# Patient Record
Sex: MALE | Race: OTHER | HISPANIC OR LATINO
[De-identification: names, ages, dates, MRNs, and addresses within clinical notes are randomized per-mention and may not be internally consistent; named-entity substitution may affect disease eponyms.]

---

## 2018-06-27 ENCOUNTER — APPOINTMENT (OUTPATIENT)
Dept: PULMONOLOGY | Facility: CLINIC | Age: 73
End: 2018-06-27
Payer: MEDICARE

## 2018-06-27 VITALS
OXYGEN SATURATION: 97 % | DIASTOLIC BLOOD PRESSURE: 80 MMHG | BODY MASS INDEX: 29.86 KG/M2 | HEART RATE: 86 BPM | SYSTOLIC BLOOD PRESSURE: 128 MMHG | HEIGHT: 68 IN | WEIGHT: 197 LBS

## 2018-06-27 PROCEDURE — 94664 DEMO&/EVAL PT USE INHALER: CPT | Mod: 59

## 2018-06-27 PROCEDURE — 94060 EVALUATION OF WHEEZING: CPT

## 2018-06-27 PROCEDURE — 99205 OFFICE O/P NEW HI 60 MIN: CPT | Mod: 25

## 2018-06-27 PROCEDURE — 94729 DIFFUSING CAPACITY: CPT

## 2018-06-27 PROCEDURE — 94727 GAS DIL/WSHOT DETER LNG VOL: CPT

## 2018-06-27 PROCEDURE — 85018 HEMOGLOBIN: CPT | Mod: QW

## 2018-12-12 ENCOUNTER — APPOINTMENT (OUTPATIENT)
Dept: PULMONOLOGY | Facility: CLINIC | Age: 73
End: 2018-12-12

## 2018-12-21 ENCOUNTER — APPOINTMENT (OUTPATIENT)
Dept: ORTHOPEDIC SURGERY | Facility: CLINIC | Age: 73
End: 2018-12-21
Payer: MEDICARE

## 2018-12-21 VITALS
HEIGHT: 68 IN | HEART RATE: 89 BPM | BODY MASS INDEX: 29.86 KG/M2 | WEIGHT: 197 LBS | DIASTOLIC BLOOD PRESSURE: 78 MMHG | SYSTOLIC BLOOD PRESSURE: 158 MMHG

## 2018-12-21 DIAGNOSIS — M16.11 UNILATERAL PRIMARY OSTEOARTHRITIS, RIGHT HIP: ICD-10-CM

## 2018-12-21 DIAGNOSIS — M17.11 UNILATERAL PRIMARY OSTEOARTHRITIS, RIGHT KNEE: ICD-10-CM

## 2018-12-21 DIAGNOSIS — Z78.9 OTHER SPECIFIED HEALTH STATUS: ICD-10-CM

## 2018-12-21 DIAGNOSIS — Z80.9 FAMILY HISTORY OF MALIGNANT NEOPLASM, UNSPECIFIED: ICD-10-CM

## 2018-12-21 PROCEDURE — 99203 OFFICE O/P NEW LOW 30 MIN: CPT

## 2018-12-21 PROCEDURE — 73502 X-RAY EXAM HIP UNI 2-3 VIEWS: CPT | Mod: RT

## 2018-12-21 PROCEDURE — 73562 X-RAY EXAM OF KNEE 3: CPT | Mod: RT

## 2019-08-19 ENCOUNTER — APPOINTMENT (OUTPATIENT)
Dept: PULMONOLOGY | Facility: CLINIC | Age: 74
End: 2019-08-19
Payer: MEDICARE

## 2019-08-19 VITALS
WEIGHT: 194 LBS | DIASTOLIC BLOOD PRESSURE: 78 MMHG | BODY MASS INDEX: 29.5 KG/M2 | HEART RATE: 78 BPM | SYSTOLIC BLOOD PRESSURE: 130 MMHG | OXYGEN SATURATION: 96 %

## 2019-08-19 DIAGNOSIS — Z12.2 ENCOUNTER FOR SCREENING FOR MALIGNANT NEOPLASM OF RESPIRATORY ORGANS: ICD-10-CM

## 2019-08-19 DIAGNOSIS — R05 COUGH: ICD-10-CM

## 2019-08-19 DIAGNOSIS — F17.200 NICOTINE DEPENDENCE, UNSPECIFIED, UNCOMPLICATED: ICD-10-CM

## 2019-08-19 PROCEDURE — 94010 BREATHING CAPACITY TEST: CPT

## 2019-08-19 PROCEDURE — 99214 OFFICE O/P EST MOD 30 MIN: CPT | Mod: 25

## 2019-08-19 RX ORDER — BUPROPION HYDROCHLORIDE 75 MG/1
75 TABLET, FILM COATED ORAL
Refills: 0 | Status: DISCONTINUED | COMMUNITY
End: 2019-08-19

## 2019-08-19 NOTE — PHYSICAL EXAM
[General Appearance - Well Developed] : well developed [General Appearance - Well Nourished] : well nourished [Normal Conjunctiva] : the conjunctiva exhibited no abnormalities [Normal Oropharynx] : normal oropharynx [Neck Appearance] : the appearance of the neck was normal [Jugular Venous Distention Increased] : there was no jugular-venous distention [Neck Cervical Mass (___cm)] : no neck mass was observed [Heart Rate And Rhythm] : heart rate and rhythm were normal [Thyroid Diffuse Enlargement] : the thyroid was not enlarged [Murmurs] : no murmurs present [Heart Sounds] : normal S1 and S2 [Edema] : no peripheral edema present [Arterial Pulses Normal] : the arterial pulses were normal [Respiration, Rhythm And Depth] : normal respiratory rhythm and effort [] : no respiratory distress [Exaggerated Use Of Accessory Muscles For Inspiration] : no accessory muscle use [Auscultation Breath Sounds / Voice Sounds] : lungs were clear to auscultation bilaterally [Abdomen Soft] : soft [Abdomen Tenderness] : non-tender [Abnormal Walk] : normal gait [Nail Clubbing] : no clubbing of the fingernails [Cyanosis, Localized] : no localized cyanosis [Oriented To Time, Place, And Person] : oriented to person, place, and time [No Focal Deficits] : no focal deficits [Skin Turgor] : normal skin turgor

## 2019-08-22 NOTE — REASON FOR VISIT
[Follow-Up] : a follow-up visit [Cough] : cough [FreeTextEntry2] : encounter for lung cancer screening

## 2019-10-22 ENCOUNTER — APPOINTMENT (OUTPATIENT)
Dept: PULMONOLOGY | Facility: CLINIC | Age: 74
End: 2019-10-22
Payer: MEDICARE

## 2019-10-22 VITALS
WEIGHT: 192 LBS | OXYGEN SATURATION: 98 % | HEART RATE: 80 BPM | DIASTOLIC BLOOD PRESSURE: 70 MMHG | HEIGHT: 68.5 IN | SYSTOLIC BLOOD PRESSURE: 140 MMHG | BODY MASS INDEX: 28.77 KG/M2

## 2019-10-22 VITALS — RESPIRATION RATE: 16 BRPM

## 2019-10-22 PROCEDURE — 99214 OFFICE O/P EST MOD 30 MIN: CPT

## 2019-10-22 RX ORDER — GUAIFENESIN/DEXTROMETHORPHAN 1200-60MG
1200-60 TABLET, EXTENDED RELEASE 12 HR ORAL
Refills: 0 | Status: DISCONTINUED | COMMUNITY
End: 2019-10-22

## 2019-10-22 NOTE — ASSESSMENT
[FreeTextEntry1] : I suspect the patient has obstructive sleep apnea owing to his oral anatomy and is unrefreshing sleep. A home sleep study has been ordered and if positive will be treated with auto Pap. If negative, he will need a formal in-lab sleep study.
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
## 2019-10-22 NOTE — PHYSICAL EXAM
[General Appearance - Well Nourished] : well nourished [General Appearance - Well Developed] : well developed [Normal Conjunctiva] : the conjunctiva exhibited no abnormalities [Normal Oropharynx] : abnormal oropharynx [Enlarged Base of the Tongue] : enlargement of the base of the tongue [Low Lying Soft Palate] : low lying soft palate [III] : III [FreeTextEntry1] : Lateral ridging to tongue [Neck Appearance] : the appearance of the neck was normal [Neck Cervical Mass (___cm)] : no neck mass was observed [Thyroid Diffuse Enlargement] : the thyroid was not enlarged [Jugular Venous Distention Increased] : there was no jugular-venous distention [Neck Circumference: ___] : neck circumference is [unfilled] [Heart Rate And Rhythm] : heart rate and rhythm were normal [Murmurs] : no murmurs present [Heart Sounds] : normal S1 and S2 [Arterial Pulses Normal] : the arterial pulses were normal [] : no respiratory distress [Edema] : no peripheral edema present [Respiration, Rhythm And Depth] : normal respiratory rhythm and effort [Exaggerated Use Of Accessory Muscles For Inspiration] : no accessory muscle use [Auscultation Breath Sounds / Voice Sounds] : lungs were clear to auscultation bilaterally [Abdomen Soft] : soft [Abdomen Tenderness] : non-tender [Abnormal Walk] : normal gait [Nail Clubbing] : no clubbing of the fingernails [Cyanosis, Localized] : no localized cyanosis [No Focal Deficits] : no focal deficits [Oriented To Time, Place, And Person] : oriented to person, place, and time [Skin Turgor] : normal skin turgor

## 2019-10-23 ENCOUNTER — APPOINTMENT (OUTPATIENT)
Dept: PULMONOLOGY | Facility: CLINIC | Age: 74
End: 2019-10-23

## 2019-11-21 ENCOUNTER — OUTPATIENT (OUTPATIENT)
Dept: OUTPATIENT SERVICES | Facility: HOSPITAL | Age: 74
LOS: 1 days | End: 2019-11-21
Payer: MEDICARE

## 2019-11-21 DIAGNOSIS — G47.33 OBSTRUCTIVE SLEEP APNEA (ADULT) (PEDIATRIC): ICD-10-CM

## 2019-11-21 PROCEDURE — G0399: CPT

## 2019-11-21 PROCEDURE — 95806 SLEEP STUDY UNATT&RESP EFFT: CPT | Mod: 26

## 2019-11-21 PROCEDURE — 95806 SLEEP STUDY UNATT&RESP EFFT: CPT

## 2020-01-17 ENCOUNTER — APPOINTMENT (OUTPATIENT)
Dept: PULMONOLOGY | Facility: CLINIC | Age: 75
End: 2020-01-17

## 2021-03-31 ENCOUNTER — APPOINTMENT (OUTPATIENT)
Dept: NEUROLOGY | Facility: CLINIC | Age: 76
End: 2021-03-31
Payer: MEDICARE

## 2021-03-31 VITALS — DIASTOLIC BLOOD PRESSURE: 80 MMHG | TEMPERATURE: 98.2 F | HEIGHT: 68.5 IN | SYSTOLIC BLOOD PRESSURE: 160 MMHG

## 2021-03-31 PROCEDURE — 99072 ADDL SUPL MATRL&STAF TM PHE: CPT

## 2021-03-31 PROCEDURE — 99204 OFFICE O/P NEW MOD 45 MIN: CPT

## 2022-01-31 ENCOUNTER — APPOINTMENT (OUTPATIENT)
Dept: NEUROLOGY | Facility: CLINIC | Age: 77
End: 2022-01-31
Payer: MEDICARE

## 2022-01-31 VITALS
DIASTOLIC BLOOD PRESSURE: 90 MMHG | BODY MASS INDEX: 30.31 KG/M2 | HEIGHT: 68 IN | WEIGHT: 200 LBS | SYSTOLIC BLOOD PRESSURE: 130 MMHG

## 2022-01-31 DIAGNOSIS — G50.1 ATYPICAL FACIAL PAIN: ICD-10-CM

## 2022-01-31 PROCEDURE — 99214 OFFICE O/P EST MOD 30 MIN: CPT

## 2022-05-02 ENCOUNTER — APPOINTMENT (OUTPATIENT)
Dept: NEUROLOGY | Facility: CLINIC | Age: 77
End: 2022-05-02
Payer: MEDICARE

## 2022-05-02 VITALS — HEIGHT: 68 IN | BODY MASS INDEX: 28.79 KG/M2 | WEIGHT: 190 LBS

## 2022-05-02 DIAGNOSIS — G50.0 TRIGEMINAL NEURALGIA: ICD-10-CM

## 2022-05-02 PROCEDURE — 99214 OFFICE O/P EST MOD 30 MIN: CPT

## 2022-05-02 NOTE — DATA REVIEWED
[de-identified] : Brain and Orbit MRI dated 8/6/18 reports  minimal small vessel ischemic changes.\par \par Brain MRA  dated 7/11/18 reported negative. [de-identified] : Other records available to review includes:\par Pulmonary dr. Connell\par Orthopedic note Dr Vaughan

## 2022-05-02 NOTE — HISTORY OF PRESENT ILLNESS
[FreeTextEntry1] : I saw him initially 3/31/21 with the following history. He is here with his wife who serves as independent historian. He underwent dental implants left maxillary region in January of 2018. Following this he developed left-sided facial pain which has persisted. Will wax and wane, occasionally severe. He gets stabbing pain emanating from the jaw radiating up the side of the face. There is also pins and needles sensation. Touching the area can bring it out\par \par He has had imaging studies as discussed below. He has seen neurologists in the past. He has tried Lyrica and carbamazepine which did not help. He is thinking of trying medical marijuana.\par \par I prescribed gabapentin 300 mg twice a day. This helped initially and pain resolved and he stopped the gabapentin. Pain has come back and the gabapentin at 300 mg twice a day was not helping.  We built the gabapentin up to 600 mg 3 times a day.  He has been doing well on that and is pain-free.

## 2022-05-02 NOTE — ASSESSMENT
[FreeTextEntry1] : Atypical facial pain. Possible trigeminal meralgia.\par \par He will try and slowly reduce the gabapentin as tolerated.\par \par Routine followup scheduled for 3 months

## 2022-05-02 NOTE — CONSULT LETTER
[Dear  ___] : Dear  [unfilled], [Consult Letter:] : I had the pleasure of evaluating your patient, [unfilled]. [Please see my note below.] : Please see my note below. [Consult Closing:] : Thank you very much for allowing me to participate in the care of this patient.  If you have any questions, please do not hesitate to contact me. [Sincerely,] : Sincerely, [FreeTextEntry3] : Jim Banks MD, PhD, DPN-N\par [facility]\par Neurology at [place]\par Chief, Division of Neurology\par [facility]\par

## 2022-05-02 NOTE — PHYSICAL EXAM
[General Appearance - Alert] : alert [General Appearance - In No Acute Distress] : in no acute distress [General Appearance - Well-Appearing] : healthy appearing [Oriented To Time, Place, And Person] : oriented to person, place, and time [Impaired Insight] : insight and judgment were intact [Affect] : the affect was normal [Memory Recent] : recent memory was not impaired [Person] : oriented to person [Place] : oriented to place [Time] : oriented to time [Concentration Intact] : normal concentrating ability [Fluency] : fluency intact [Comprehension] : comprehension intact [Cranial Nerves Optic (II)] : visual acuity intact bilaterally,  visual fields full to confrontation, pupils equal round and reactive to light [Cranial Nerves Oculomotor (III)] : extraocular motion intact [Cranial Nerves Trigeminal (V)] : facial sensation intact symmetrically [Cranial Nerves Facial (VII)] : face symmetrical [Cranial Nerves Vestibulocochlear (VIII)] : hearing was intact bilaterally [Cranial Nerves Glossopharyngeal (IX)] : tongue and palate midline [Cranial Nerves Accessory (XI - Cranial And Spinal)] : head turning and shoulder shrug symmetric [Cranial Nerves Hypoglossal (XII)] : there was no tongue deviation with protrusion [Motor Tone] : muscle tone was normal in all four extremities [Motor Strength] : muscle strength was normal in all four extremities [No Muscle Atrophy] : normal bulk in all four extremities [Paresis Pronator Drift Right-Sided] : no pronator drift on the right [Paresis Pronator Drift Left-Sided] : no pronator drift on the left [Sensation Tactile Decrease] : light touch was intact [Sensation Pain / Temperature Decrease] : pain and temperature was intact [Romberg's Sign] : Romberg's sign was negtive [Abnormal Walk] : normal gait [Balance] : balance was intact [Past-pointing] : there was no past-pointing [Tremor] : no tremor present [Coordination - Dysmetria Impaired Finger-to-Nose Bilateral] : not present [Coordination - Dysmetria Impaired Heel-to-Shin Bilateral] : not present [2+] : Ankle jerk left 2+ [Plantar Reflex Right Only] : normal on the right [Plantar Reflex Left Only] : normal on the left [PERRL With Normal Accommodation] : pupils were equal in size, round, reactive to light, with normal accommodation [Extraocular Movements] : extraocular movements were intact [Full Visual Field] : full visual field

## 2022-06-21 ENCOUNTER — APPOINTMENT (OUTPATIENT)
Dept: PULMONOLOGY | Facility: CLINIC | Age: 77
End: 2022-06-21
Payer: MEDICARE

## 2022-06-21 VITALS
RESPIRATION RATE: 16 BRPM | OXYGEN SATURATION: 98 % | HEART RATE: 78 BPM | WEIGHT: 189 LBS | SYSTOLIC BLOOD PRESSURE: 132 MMHG | HEIGHT: 68 IN | DIASTOLIC BLOOD PRESSURE: 74 MMHG | BODY MASS INDEX: 28.64 KG/M2

## 2022-06-21 PROCEDURE — 99214 OFFICE O/P EST MOD 30 MIN: CPT | Mod: 25

## 2022-06-21 PROCEDURE — 99406 BEHAV CHNG SMOKING 3-10 MIN: CPT

## 2022-06-21 RX ORDER — GABAPENTIN 600 MG/1
600 TABLET, COATED ORAL 3 TIMES DAILY
Qty: 90 | Refills: 3 | Status: DISCONTINUED | COMMUNITY
Start: 2022-01-31 | End: 2022-06-21

## 2022-06-21 NOTE — COUNSELING
[Cessation strategies including cessation program discussed] : Cessation strategies including cessation program discussed [Use of nicotine replacement therapies and other medications discussed] : Use of nicotine replacement therapies and other medications discussed [Encouraged to pick a quit date and identify support needed to quit] : Encouraged to pick a quit date and identify support needed to quit [Yes] : Willing to quit smoking [FreeTextEntry1] : 5

## 2022-06-21 NOTE — PHYSICAL EXAM
[No Acute Distress] : no acute distress [Normal Oropharynx] : normal oropharynx [Retrognathia] : retrognathia [Normal Appearance] : normal appearance [No Neck Mass] : no neck mass [Normal Rate/Rhythm] : normal rate/rhythm [Normal S1, S2] : normal s1, s2 [No Murmurs] : no murmurs [No Resp Distress] : no resp distress [Clear to Auscultation Bilaterally] : clear to auscultation bilaterally [No Abnormalities] : no abnormalities [Benign] : benign [Normal Gait] : normal gait [No Clubbing] : no clubbing [No Cyanosis] : no cyanosis [No Edema] : no edema [FROM] : FROM [Normal Color/ Pigmentation] : normal color/ pigmentation [No Focal Deficits] : no focal deficits [Oriented x3] : oriented x3 [Normal Affect] : normal affect

## 2022-06-21 NOTE — HISTORY OF PRESENT ILLNESS
[TextBox_4] : The patient has not been seen here in almost 3 years. He continues to smoke one half pack of cigarettes per day. Denies shortness of breath coughing or wheezing. He has not had a screening CT scan in 3 years. Recent chest x-rays have been unremarkable although one was read as showing COPD.

## 2022-06-21 NOTE — ASSESSMENT
[FreeTextEntry1] : Patient does not have significant COPD. Pulmonary function studies have been at the lower limits of normal in the past. He is asymptomatic. Low dose screening CT scan has been ordered. We'll follow that up with him by phone. If all is well we can screen him once a year.\par \par Smoking cessation advised.

## 2022-08-02 ENCOUNTER — APPOINTMENT (OUTPATIENT)
Dept: NEUROLOGY | Facility: CLINIC | Age: 77
End: 2022-08-02

## 2023-05-12 ENCOUNTER — NON-APPOINTMENT (OUTPATIENT)
Age: 78
End: 2023-05-12

## 2023-05-24 ENCOUNTER — APPOINTMENT (OUTPATIENT)
Dept: FAMILY MEDICINE | Facility: CLINIC | Age: 78
End: 2023-05-24
Payer: MEDICARE

## 2023-05-24 VITALS
WEIGHT: 197 LBS | TEMPERATURE: 98.8 F | BODY MASS INDEX: 29.86 KG/M2 | OXYGEN SATURATION: 98 % | HEART RATE: 84 BPM | DIASTOLIC BLOOD PRESSURE: 87 MMHG | HEIGHT: 68 IN | RESPIRATION RATE: 16 BRPM | SYSTOLIC BLOOD PRESSURE: 143 MMHG

## 2023-05-24 DIAGNOSIS — M53.3 SACROCOCCYGEAL DISORDERS, NOT ELSEWHERE CLASSIFIED: ICD-10-CM

## 2023-05-24 DIAGNOSIS — Z00.00 ENCOUNTER FOR GENERAL ADULT MEDICAL EXAMINATION W/OUT ABNORMAL FINDINGS: ICD-10-CM

## 2023-05-24 PROCEDURE — 99387 INIT PM E/M NEW PAT 65+ YRS: CPT | Mod: 25

## 2023-05-24 PROCEDURE — G0444 DEPRESSION SCREEN ANNUAL: CPT | Mod: 59

## 2023-05-24 PROCEDURE — 36415 COLL VENOUS BLD VENIPUNCTURE: CPT

## 2023-05-24 NOTE — REVIEW OF SYSTEMS
[Depression] : depression [Negative] : Gastrointestinal [Unsteady Walk] : no ataxia [Suicidal] : not suicidal [FreeTextEntry9] : Sacral pain

## 2023-05-24 NOTE — ASSESSMENT
[FreeTextEntry1] : CPE-routine labs drawn today, will follow-up results.  We will request records from previous PCP Dr. Escobar.\par \par Screenings: For repeat colonoscopy, referral back to Dr. PIERCE given today.\par Positive depression screen.  He currently contracts to safety. DASH/crisis resources given.  Will restart Wellbutrin.\par \par Sacral pain-we will further evaluate with an x-ray.  Follow-up to be determined based on results.\par \par Hypertension-we will restart amlodipine 5 mg daily. \par \par Current everyday smoker-counseled on smoking cessation.  He is willing to try Wellbutrin again, Rx prescribed.\par \par Atypical chest pain-currently not in pain, however given risk factors, will refer to cardiology for cardiac work-up including echocardiogram.  Also will refer back to GI determine if repeat endoscopy needed\par \par Depression-currently contracts to safety.  Will restart Wellbutrin and have him follow-up in 2 months for reevaluation.  Offered behavioral health counseling which he declines at this time.\par \par RTC in 2 months.

## 2023-05-24 NOTE — HISTORY OF PRESENT ILLNESS
[Spouse] : spouse [FreeTextEntry1] : pt. here for cJasonpmacie. [de-identified] : Brandon is a 76 yo M with pmhx of pretension, facial pain, nicotine dependence, occasional reflux, mild cognitive deficiency, who presents for new patient CPE.  Previous PCP Dr. Lincoln Escobar no longer accepted by his insurance. \par \par For his hypertension, he was previously on amlodipine 5 mg daily but ran out of medications so has been without it.\par \par He takes gabapentin occasionally for the facial pain.  Follows with neurology Dr. Banks.  He states that he was also evaluated by another neurologist for memory changes and diagnosed with the mild cognitive deficiency.\par \par He is a current smoker, has smoked since the age of 19, smokes 10 to 15 cigarettes/day.  He has been able to quit temporarily at times with the nicotine patch and with Wellbutrin.  He is requesting to restart Wellbutrin for the smoking as well as to help with depression.  He reports occasional SI but states that he would never follow through with it, siting that he needs to be around for his wife and to take care of logistical matters. \par \par He reports sacral pain that is increased in the last year.  Denies any recent trauma, states that he fell and landed on his sacrum when he was about 8 years old.  Denies any bowel or bladder incontinence.  He states that when he gets up from a sitting position he has to do so very slowly due to severe pain.  He states he has been told he has cervical disc herniations at C6-C7.\par \par He saw pulmonology Dr. Connell for COPD.  PFTs in the past have been at the lower limits of normal.  He is otherwise asymptomatic.  He did have a low-dose screening CT 7/1/2023 which was negative.\par \par Last colonoscopy in 2016 and was told to repeat in 5 years but has not done so.  He has also had endoscopies in the past with .\par \par He reports occasional epigastric pain.  This is usually relieved with drinking cold water.  He would like to see a cardiologist.

## 2023-05-25 ENCOUNTER — NON-APPOINTMENT (OUTPATIENT)
Age: 78
End: 2023-05-25

## 2023-05-25 LAB
25(OH)D3 SERPL-MCNC: 28.9 NG/ML
ALBUMIN SERPL ELPH-MCNC: 4.2 G/DL
ALP BLD-CCNC: 81 U/L
ALT SERPL-CCNC: 21 U/L
ANION GAP SERPL CALC-SCNC: 14 MMOL/L
AST SERPL-CCNC: 15 U/L
BILIRUB SERPL-MCNC: 0.3 MG/DL
BUN SERPL-MCNC: 17 MG/DL
CALCIUM SERPL-MCNC: 9.2 MG/DL
CHLORIDE SERPL-SCNC: 109 MMOL/L
CHOLEST SERPL-MCNC: 181 MG/DL
CO2 SERPL-SCNC: 22 MMOL/L
CREAT SERPL-MCNC: 1.11 MG/DL
EGFR: 69 ML/MIN/1.73M2
ESTIMATED AVERAGE GLUCOSE: 126 MG/DL
GLUCOSE SERPL-MCNC: 140 MG/DL
HBA1C MFR BLD HPLC: 6 %
HCV AB SER QL: NONREACTIVE
HCV S/CO RATIO: 0.69 S/CO
HDLC SERPL-MCNC: 50 MG/DL
HIV1+2 AB SPEC QL IA.RAPID: NONREACTIVE
LDLC SERPL CALC-MCNC: 94 MG/DL
NONHDLC SERPL-MCNC: 131 MG/DL
POTASSIUM SERPL-SCNC: 4.2 MMOL/L
PROT SERPL-MCNC: 6.9 G/DL
SODIUM SERPL-SCNC: 145 MMOL/L
TRIGL SERPL-MCNC: 182 MG/DL
TSH SERPL-ACNC: 1.88 UIU/ML

## 2023-05-26 ENCOUNTER — NON-APPOINTMENT (OUTPATIENT)
Age: 78
End: 2023-05-26

## 2023-06-07 ENCOUNTER — NON-APPOINTMENT (OUTPATIENT)
Age: 78
End: 2023-06-07

## 2023-06-07 ENCOUNTER — TRANSCRIPTION ENCOUNTER (OUTPATIENT)
Age: 78
End: 2023-06-07

## 2023-07-24 ENCOUNTER — APPOINTMENT (OUTPATIENT)
Dept: FAMILY MEDICINE | Facility: CLINIC | Age: 78
End: 2023-07-24
Payer: MEDICARE

## 2023-07-24 VITALS
SYSTOLIC BLOOD PRESSURE: 125 MMHG | RESPIRATION RATE: 16 BRPM | HEART RATE: 80 BPM | DIASTOLIC BLOOD PRESSURE: 78 MMHG | TEMPERATURE: 98.1 F

## 2023-07-24 DIAGNOSIS — R45.89 OTHER SYMPTOMS AND SIGNS INVOLVING EMOTIONAL STATE: ICD-10-CM

## 2023-07-24 DIAGNOSIS — H61.21 IMPACTED CERUMEN, RIGHT EAR: ICD-10-CM

## 2023-07-24 PROCEDURE — 99214 OFFICE O/P EST MOD 30 MIN: CPT

## 2023-07-24 NOTE — ASSESSMENT
[FreeTextEntry1] : HTN-blood pressure improved and expectable since restarting amlodipine 5 mg daily, refill sent\par \par HLD-reviewed his lab work in detail with him and his wife.  Discussed that even though his lipid panel numbers look good, he has an elevated 10-year ASCVD risk score of 28%.  For this reason, discussed risk factors including hypertension and smoking and will start atorvastatin 10 mg daily.\par \par Current everyday smoker-counseled on smoking cessation.  Has been able to decrease number of cigarettes smoked since starting Wellbutrin.  Since he is feeling well on current dose, will continue at 150 mg daily and reevaluate at next visit if dosage needs to be adjusted\par \par Depressed mood-improved since starting Wellbutrin, continue same dose for now and will reevaluate at next visit\par \par Impacted cerumen of right ear-counseled on using Debrox drops and flushing with warm water while sitting\par \par RTC in 6 months

## 2023-07-24 NOTE — HISTORY OF PRESENT ILLNESS
[Spouse] : spouse [FreeTextEntry1] : 2 month follow up [de-identified] : Brandon is a 77-year-old male with past medical history of hypertension, facial pain, nicotine dependence, occasional reflux, mild cognitive deficiency who presents today for follow-up.\par \par For his hypertension, he is taking amlodipine 5 mg daily and has been compliant with it.\par \par For his smoking and depression, at the time of our last visit he was prescribed Wellbutrin to try again.  He has been tolerating the Wellbutrin 150 mg daily.  He states that it has been helping with his depression.  He has been able to cut down on cigarette smoking but not completely discontinue.  He notes recent stressors including trying to buy a new car for his wife.  For this reason, he has not yet scheduled an appointment with cardiology or gastroenterology but is planning on doing so\par \par He notes decreased hearing in his right ear\par

## 2023-08-15 ENCOUNTER — NON-APPOINTMENT (OUTPATIENT)
Age: 78
End: 2023-08-15

## 2023-08-22 ENCOUNTER — NON-APPOINTMENT (OUTPATIENT)
Age: 78
End: 2023-08-22

## 2023-08-22 ENCOUNTER — APPOINTMENT (OUTPATIENT)
Dept: GASTROENTEROLOGY | Facility: CLINIC | Age: 78
End: 2023-08-22
Payer: MEDICARE

## 2023-08-22 VITALS
SYSTOLIC BLOOD PRESSURE: 152 MMHG | BODY MASS INDEX: 29.55 KG/M2 | HEIGHT: 68 IN | WEIGHT: 195 LBS | RESPIRATION RATE: 14 BRPM | HEART RATE: 86 BPM | TEMPERATURE: 98 F | DIASTOLIC BLOOD PRESSURE: 96 MMHG | OXYGEN SATURATION: 98 %

## 2023-08-22 DIAGNOSIS — R10.13 EPIGASTRIC PAIN: ICD-10-CM

## 2023-08-22 PROCEDURE — 99204 OFFICE O/P NEW MOD 45 MIN: CPT

## 2023-08-22 RX ORDER — GABAPENTIN 300 MG/1
300 CAPSULE ORAL TWICE DAILY
Qty: 60 | Refills: 2 | Status: DISCONTINUED | COMMUNITY
Start: 2021-03-31 | End: 2023-08-22

## 2023-08-22 NOTE — PHYSICAL EXAM

## 2023-08-23 NOTE — REASON FOR VISIT
[Initial Evaluation] : an initial evaluation [FreeTextEntry1] : colon cancer screening, history of polyps, epigastric pain, heartburn

## 2023-08-23 NOTE — ADDENDUM
[FreeTextEntry1] : I, Kenia Amin NP, acted as scribe for Dr. José Luis Parsons for this patient encounter

## 2023-08-23 NOTE — ASSESSMENT
[FreeTextEntry1] : 77 year old male with personal history of colon polyps and upper GI complaints presenting for evaluation. Epigastric pain, heartburn, dysphagia, likely secondary to chronic GERD. Will schedule both EGD and colonoscopy for further evaluation. I have discussed the indications, benefits, risks  (including but not limited to reaction to the anesthesia, infection, bleeding, missed lesions, and perforation),  and alternatives to an EGD and colonoscopy with the patient. He  understands all options and has agreed to have both procedures. He is medically optimized.   [facility] GERD diet  GI attending.  History, physical, assessment, plan as outlined above represents my understanding of this case.  ROBERT NP, served as my scribe.   77-year-old male with hypertension hyperlipidemia and personal history of colon polyps and positive family history for multiple members with colon cancer in first and second-degree relatives presents for evaluation of a polyp surveillance colonoscopy.  He also has a history of chronic heartburn regurgitation and dyspepsia.  No upper GI alarm symptoms.  Physical exam unrevealing.  Quite robust in appearance.  Appropriate candidate for polyp surveillance colonoscopy.  Appropriate candidate for EGD.  ASA #2.  Arrangements made.

## 2023-09-26 ENCOUNTER — TRANSCRIPTION ENCOUNTER (OUTPATIENT)
Age: 78
End: 2023-09-26

## 2023-09-27 ENCOUNTER — OUTPATIENT (OUTPATIENT)
Dept: OUTPATIENT SERVICES | Facility: HOSPITAL | Age: 78
LOS: 1 days | End: 2023-09-27
Payer: MEDICARE

## 2023-09-27 ENCOUNTER — RESULT REVIEW (OUTPATIENT)
Age: 78
End: 2023-09-27

## 2023-09-27 ENCOUNTER — APPOINTMENT (OUTPATIENT)
Dept: GASTROENTEROLOGY | Facility: GI CENTER | Age: 78
End: 2023-09-27
Payer: MEDICARE

## 2023-09-27 DIAGNOSIS — R13.19 OTHER DYSPHAGIA: ICD-10-CM

## 2023-09-27 DIAGNOSIS — Z12.11 ENCOUNTER FOR SCREENING FOR MALIGNANT NEOPLASM OF COLON: ICD-10-CM

## 2023-09-27 DIAGNOSIS — Z80.0 FAMILY HISTORY OF MALIGNANT NEOPLASM OF DIGESTIVE ORGANS: ICD-10-CM

## 2023-09-27 DIAGNOSIS — Z86.010 PERSONAL HISTORY OF COLONIC POLYPS: ICD-10-CM

## 2023-09-27 DIAGNOSIS — R10.13 EPIGASTRIC PAIN: ICD-10-CM

## 2023-09-27 PROCEDURE — 88342 IMHCHEM/IMCYTCHM 1ST ANTB: CPT

## 2023-09-27 PROCEDURE — 88305 TISSUE EXAM BY PATHOLOGIST: CPT

## 2023-09-27 PROCEDURE — 88305 TISSUE EXAM BY PATHOLOGIST: CPT | Mod: 26

## 2023-09-27 PROCEDURE — 43239 EGD BIOPSY SINGLE/MULTIPLE: CPT

## 2023-09-27 PROCEDURE — 88342 IMHCHEM/IMCYTCHM 1ST ANTB: CPT | Mod: 26

## 2023-09-27 PROCEDURE — 43239 EGD BIOPSY SINGLE/MULTIPLE: CPT | Mod: 59

## 2023-09-27 PROCEDURE — G0121: CPT

## 2023-09-27 PROCEDURE — 45378 DIAGNOSTIC COLONOSCOPY: CPT | Mod: PT

## 2023-09-29 ENCOUNTER — APPOINTMENT (OUTPATIENT)
Dept: NEUROLOGY | Facility: CLINIC | Age: 78
End: 2023-09-29

## 2023-10-12 LAB — SURGICAL PATHOLOGY STUDY: SIGNIFICANT CHANGE UP

## 2024-01-03 ENCOUNTER — RX RENEWAL (OUTPATIENT)
Age: 79
End: 2024-01-03

## 2024-01-23 ENCOUNTER — APPOINTMENT (OUTPATIENT)
Dept: FAMILY MEDICINE | Facility: CLINIC | Age: 79
End: 2024-01-23
Payer: MEDICARE

## 2024-01-23 ENCOUNTER — NON-APPOINTMENT (OUTPATIENT)
Age: 79
End: 2024-01-23

## 2024-01-23 VITALS
DIASTOLIC BLOOD PRESSURE: 84 MMHG | WEIGHT: 175 LBS | SYSTOLIC BLOOD PRESSURE: 130 MMHG | HEIGHT: 68 IN | BODY MASS INDEX: 26.52 KG/M2 | OXYGEN SATURATION: 98 % | HEART RATE: 86 BPM

## 2024-01-23 PROCEDURE — 36415 COLL VENOUS BLD VENIPUNCTURE: CPT

## 2024-01-23 PROCEDURE — 99406 BEHAV CHNG SMOKING 3-10 MIN: CPT

## 2024-01-23 PROCEDURE — 99214 OFFICE O/P EST MOD 30 MIN: CPT

## 2024-01-23 RX ORDER — BUPROPION HYDROCHLORIDE 150 MG/1
150 TABLET, EXTENDED RELEASE ORAL DAILY
Qty: 90 | Refills: 1 | Status: DISCONTINUED | COMMUNITY
Start: 2023-05-24 | End: 2024-01-23

## 2024-01-23 NOTE — HISTORY OF PRESENT ILLNESS
[FreeTextEntry1] : 79 yo patient presents to office for a follow up. Patient states he needs a referral CT of the lung as pulmonologist is on leave.  [de-identified] : Brandon is a 78-year-old male with past medical history of hypertension, nicotine dependence, mild cognitive deficiency, occasional reflux who presents today for follow-up.  For hypertension, he takes amlodipine 5 mg daily.  At the time of his last visit, he was started on atorvastatin 10 mg daily due to elevated 10-year ASCVD risk score.  He is tolerating this with no side effects.  He he stopped taking Wellbutrin because it was not helping him to decrease smoking.  He is now smoking a little less about 5 to 7 cigarettes/day but states that when he is stressed he smokes.  He has not seen a cardiologist but is interested in having cardiac workup.  He recalls receiving his pneumonia vaccine at his pharmacy.

## 2024-01-23 NOTE — ASSESSMENT
[FreeTextEntry1] : Hypertension-well-controlled on amlodipine, continue same  Hyperlipidemia-recheck lipid panel today, continue statin and counseled on healthy lifestyle modifications  Nicotine dependence/COPD-due for repeat annual low-dose CT, order given. counseled on benefits of smoking cessation and encouraged to continue with efforts.  RTC in 6 months for CPE, or sooner as needed

## 2024-01-26 ENCOUNTER — NON-APPOINTMENT (OUTPATIENT)
Age: 79
End: 2024-01-26

## 2024-01-29 LAB
ANION GAP SERPL CALC-SCNC: 14 MMOL/L
BUN SERPL-MCNC: 14 MG/DL
CALCIUM SERPL-MCNC: 9.5 MG/DL
CHLORIDE SERPL-SCNC: 104 MMOL/L
CHOLEST SERPL-MCNC: 154 MG/DL
CO2 SERPL-SCNC: 25 MMOL/L
CREAT SERPL-MCNC: 1.5 MG/DL
EGFR: 48 ML/MIN/1.73M2
ESTIMATED AVERAGE GLUCOSE: 128 MG/DL
GLUCOSE SERPL-MCNC: 104 MG/DL
HBA1C MFR BLD HPLC: 6.1 %
HDLC SERPL-MCNC: 52 MG/DL
LDLC SERPL CALC-MCNC: 79 MG/DL
NONHDLC SERPL-MCNC: 102 MG/DL
POTASSIUM SERPL-SCNC: 4.3 MMOL/L
SODIUM SERPL-SCNC: 143 MMOL/L
TRIGL SERPL-MCNC: 137 MG/DL

## 2024-01-30 ENCOUNTER — TRANSCRIPTION ENCOUNTER (OUTPATIENT)
Age: 79
End: 2024-01-30

## 2024-01-31 ENCOUNTER — TRANSCRIPTION ENCOUNTER (OUTPATIENT)
Age: 79
End: 2024-01-31

## 2024-01-31 ENCOUNTER — RX RENEWAL (OUTPATIENT)
Age: 79
End: 2024-01-31

## 2024-02-17 LAB
ANION GAP SERPL CALC-SCNC: 13 MMOL/L
BUN SERPL-MCNC: 17 MG/DL
CALCIUM SERPL-MCNC: 9.5 MG/DL
CHLORIDE SERPL-SCNC: 105 MMOL/L
CO2 SERPL-SCNC: 26 MMOL/L
CREAT SERPL-MCNC: 1.16 MG/DL
EGFR: 65 ML/MIN/1.73M2
GLUCOSE SERPL-MCNC: 93 MG/DL
POTASSIUM SERPL-SCNC: 4 MMOL/L
SODIUM SERPL-SCNC: 144 MMOL/L

## 2024-03-11 ENCOUNTER — APPOINTMENT (OUTPATIENT)
Dept: FAMILY MEDICINE | Facility: CLINIC | Age: 79
End: 2024-03-11
Payer: MEDICARE

## 2024-03-11 VITALS
WEIGHT: 199 LBS | SYSTOLIC BLOOD PRESSURE: 120 MMHG | BODY MASS INDEX: 30.16 KG/M2 | HEIGHT: 68 IN | DIASTOLIC BLOOD PRESSURE: 82 MMHG

## 2024-03-11 PROCEDURE — 36415 COLL VENOUS BLD VENIPUNCTURE: CPT

## 2024-03-11 PROCEDURE — 99406 BEHAV CHNG SMOKING 3-10 MIN: CPT

## 2024-03-11 PROCEDURE — G0444 DEPRESSION SCREEN ANNUAL: CPT | Mod: 59

## 2024-03-11 PROCEDURE — 99214 OFFICE O/P EST MOD 30 MIN: CPT

## 2024-03-11 RX ORDER — AMLODIPINE BESYLATE 5 MG/1
5 TABLET ORAL
Qty: 90 | Refills: 1 | Status: ACTIVE | COMMUNITY
Start: 2022-01-24 | End: 1900-01-01

## 2024-03-11 NOTE — ASSESSMENT
[FreeTextEntry1] : HTN- well controlled, continue amlodipine 5 mg daily  Hyperlipidemia-stable, continue atorvastatin 10 mg daily  Prediabetes-has been stable, will recheck A1c and counseled on healthy diet and lifestyle  Nicotine dependence-encouraged to continue with smoking cessation efforts.  Patient is in the precontemplative stage  I spent 5 minutes performing a depression screening on this patient. RTC in 3 months for CPE, or sooner as needed

## 2024-03-11 NOTE — HEALTH RISK ASSESSMENT
[No] : In the past 12 months have you used drugs other than those required for medical reasons? No [1] : 2) Feeling down, depressed, or hopeless for several days (1) [0] : 1) Little interest or pleasure doing things: Not at all (0) [PHQ-2 Negative - No further assessment needed] : PHQ-2 Negative - No further assessment needed [Current] : Current [15-19] : 15-19 [[id number]] : 1 [de-identified] : 1/2PPD

## 2024-03-11 NOTE — HISTORY OF PRESENT ILLNESS
[FreeTextEntry1] : follow up  [de-identified] : 78-year-old male with past medical history of hypertension, nicotine dependence, mild cognitive deficiency, occasional reflux who presents today for follow-up.  For hypertension, he takes amlodipine 5 mg daily. He is taking atorvastatin 10 mg daily. has list for cardiologists but has to make appt. He is still smoking 1/2 PPD, states this is less than before but does not want to cut down more. Wellbutrin was ineffective for him in the past.

## 2024-03-11 NOTE — HEALTH RISK ASSESSMENT
[No] : In the past 12 months have you used drugs other than those required for medical reasons? No [1] : 2) Feeling down, depressed, or hopeless for several days (1) [0] : 1) Little interest or pleasure doing things: Not at all (0) [PHQ-2 Negative - No further assessment needed] : PHQ-2 Negative - No further assessment needed [Current] : Current [15-19] : 15-19 [[id number]] : 1 [de-identified] : 1/2PPD

## 2024-03-11 NOTE — HISTORY OF PRESENT ILLNESS
[FreeTextEntry1] : follow up  [de-identified] : 78-year-old male with past medical history of hypertension, nicotine dependence, mild cognitive deficiency, occasional reflux who presents today for follow-up.  For hypertension, he takes amlodipine 5 mg daily. He is taking atorvastatin 10 mg daily. has list for cardiologists but has to make appt. He is still smoking 1/2 PPD, states this is less than before but does not want to cut down more. Wellbutrin was ineffective for him in the past.

## 2024-03-12 LAB
ANION GAP SERPL CALC-SCNC: 10 MMOL/L
BUN SERPL-MCNC: 19 MG/DL
CALCIUM SERPL-MCNC: 9.5 MG/DL
CHLORIDE SERPL-SCNC: 106 MMOL/L
CO2 SERPL-SCNC: 28 MMOL/L
CREAT SERPL-MCNC: 1.21 MG/DL
EGFR: 62 ML/MIN/1.73M2
ESTIMATED AVERAGE GLUCOSE: 131 MG/DL
GLUCOSE SERPL-MCNC: 110 MG/DL
HBA1C MFR BLD HPLC: 6.2 %
POTASSIUM SERPL-SCNC: 4.2 MMOL/L
SODIUM SERPL-SCNC: 143 MMOL/L

## 2024-04-02 ENCOUNTER — APPOINTMENT (OUTPATIENT)
Dept: CARDIOLOGY | Facility: CLINIC | Age: 79
End: 2024-04-02
Payer: MEDICARE

## 2024-04-02 ENCOUNTER — NON-APPOINTMENT (OUTPATIENT)
Age: 79
End: 2024-04-02

## 2024-04-02 VITALS
DIASTOLIC BLOOD PRESSURE: 82 MMHG | RESPIRATION RATE: 16 BRPM | BODY MASS INDEX: 29.4 KG/M2 | SYSTOLIC BLOOD PRESSURE: 136 MMHG | HEIGHT: 68 IN | WEIGHT: 194 LBS | HEART RATE: 85 BPM

## 2024-04-02 DIAGNOSIS — R12 HEARTBURN: ICD-10-CM

## 2024-04-02 DIAGNOSIS — G47.33 OBSTRUCTIVE SLEEP APNEA (ADULT) (PEDIATRIC): ICD-10-CM

## 2024-04-02 DIAGNOSIS — R07.89 OTHER CHEST PAIN: ICD-10-CM

## 2024-04-02 DIAGNOSIS — R09.89 OTHER SPECIFIED SYMPTOMS AND SIGNS INVOLVING THE CIRCULATORY AND RESPIRATORY SYSTEMS: ICD-10-CM

## 2024-04-02 PROCEDURE — G2211 COMPLEX E/M VISIT ADD ON: CPT

## 2024-04-02 PROCEDURE — 99204 OFFICE O/P NEW MOD 45 MIN: CPT

## 2024-04-02 PROCEDURE — 93000 ELECTROCARDIOGRAM COMPLETE: CPT

## 2024-04-02 RX ORDER — ATORVASTATIN CALCIUM 10 MG/1
10 TABLET, FILM COATED ORAL
Qty: 90 | Refills: 3 | Status: DISCONTINUED | COMMUNITY
Start: 2023-07-24 | End: 2024-04-02

## 2024-04-02 RX ORDER — ROSUVASTATIN CALCIUM 5 MG/1
5 TABLET, FILM COATED ORAL
Qty: 90 | Refills: 3 | Status: ACTIVE | COMMUNITY
Start: 2024-04-02 | End: 1900-01-01

## 2024-04-02 RX ORDER — SODIUM SULFATE, POTASSIUM SULFATE AND MAGNESIUM SULFATE 1.6; 3.13; 17.5 G/177ML; G/177ML; G/177ML
17.5-3.13-1.6 SOLUTION ORAL
Qty: 1 | Refills: 0 | Status: DISCONTINUED | COMMUNITY
Start: 2023-08-22 | End: 2024-04-02

## 2024-05-02 ENCOUNTER — APPOINTMENT (OUTPATIENT)
Dept: CARDIOLOGY | Facility: CLINIC | Age: 79
End: 2024-05-02
Payer: MEDICARE

## 2024-05-02 PROCEDURE — 78452 HT MUSCLE IMAGE SPECT MULT: CPT

## 2024-05-02 PROCEDURE — 93015 CV STRESS TEST SUPVJ I&R: CPT

## 2024-05-02 PROCEDURE — A9500: CPT

## 2024-05-03 ENCOUNTER — APPOINTMENT (OUTPATIENT)
Dept: CARDIOLOGY | Facility: CLINIC | Age: 79
End: 2024-05-03

## 2024-05-03 ENCOUNTER — APPOINTMENT (OUTPATIENT)
Dept: CARDIOLOGY | Facility: CLINIC | Age: 79
End: 2024-05-03
Payer: MEDICARE

## 2024-05-03 PROCEDURE — ZZZZZ: CPT

## 2024-05-03 RX ORDER — REGADENOSON 0.08 MG/ML
0.4 INJECTION, SOLUTION INTRAVENOUS
Refills: 0 | Status: COMPLETED | OUTPATIENT
Start: 2024-05-03

## 2024-05-03 RX ORDER — AMINOPHYLLINE 25 MG/ML
25 INJECTION, SOLUTION INTRAVENOUS
Qty: 0 | Refills: 0 | Status: COMPLETED | OUTPATIENT
Start: 2024-05-03

## 2024-05-09 ENCOUNTER — APPOINTMENT (OUTPATIENT)
Dept: CARDIOLOGY | Facility: CLINIC | Age: 79
End: 2024-05-09
Payer: MEDICARE

## 2024-05-09 PROCEDURE — 93880 EXTRACRANIAL BILAT STUDY: CPT

## 2024-05-09 PROCEDURE — 93306 TTE W/DOPPLER COMPLETE: CPT

## 2024-05-10 ENCOUNTER — APPOINTMENT (OUTPATIENT)
Dept: CARDIOLOGY | Facility: CLINIC | Age: 79
End: 2024-05-10
Payer: MEDICARE

## 2024-05-10 ENCOUNTER — NON-APPOINTMENT (OUTPATIENT)
Age: 79
End: 2024-05-10

## 2024-05-10 VITALS
WEIGHT: 190 LBS | HEART RATE: 74 BPM | DIASTOLIC BLOOD PRESSURE: 74 MMHG | RESPIRATION RATE: 16 BRPM | HEIGHT: 68 IN | BODY MASS INDEX: 28.79 KG/M2 | SYSTOLIC BLOOD PRESSURE: 140 MMHG

## 2024-05-10 DIAGNOSIS — R73.03 PREDIABETES.: ICD-10-CM

## 2024-05-10 DIAGNOSIS — E78.5 HYPERLIPIDEMIA, UNSPECIFIED: ICD-10-CM

## 2024-05-10 DIAGNOSIS — F17.200 NICOTINE DEPENDENCE, UNSPECIFIED, UNCOMPLICATED: ICD-10-CM

## 2024-05-10 DIAGNOSIS — I65.29 OCCLUSION AND STENOSIS OF UNSPECIFIED CAROTID ARTERY: ICD-10-CM

## 2024-05-10 DIAGNOSIS — J44.9 CHRONIC OBSTRUCTIVE PULMONARY DISEASE, UNSPECIFIED: ICD-10-CM

## 2024-05-10 DIAGNOSIS — I51.89 OTHER ILL-DEFINED HEART DISEASES: ICD-10-CM

## 2024-05-10 DIAGNOSIS — I10 ESSENTIAL (PRIMARY) HYPERTENSION: ICD-10-CM

## 2024-05-10 PROCEDURE — 93000 ELECTROCARDIOGRAM COMPLETE: CPT

## 2024-05-10 PROCEDURE — 99214 OFFICE O/P EST MOD 30 MIN: CPT

## 2024-05-10 RX ORDER — ASPIRIN ENTERIC COATED TABLETS 81 MG 81 MG/1
81 TABLET, DELAYED RELEASE ORAL
Qty: 30 | Refills: 0 | Status: ACTIVE | COMMUNITY
Start: 2024-05-10

## 2024-05-10 NOTE — PHYSICAL EXAM
[Well Developed] : well developed [Well Nourished] : well nourished [No Acute Distress] : no acute distress [Normal Conjunctiva] : normal conjunctiva [Normal Venous Pressure] : normal venous pressure [No Carotid Bruit] : no carotid bruit [Normal S1, S2] : normal S1, S2 [No Rub] : no rub [No Gallop] : no gallop [Murmur] : murmur [Clear Lung Fields] : clear lung fields [Good Air Entry] : good air entry [No Respiratory Distress] : no respiratory distress  [Soft] : abdomen soft [Non Tender] : non-tender [No Masses/organomegaly] : no masses/organomegaly [Normal Gait] : normal gait [No Edema] : no edema [No Cyanosis] : no cyanosis [No Clubbing] : no clubbing [No Rash] : no rash [No Skin Lesions] : no skin lesions [Moves all extremities] : moves all extremities [No Focal Deficits] : no focal deficits [Normal Speech] : normal speech [Alert and Oriented] : alert and oriented [Normal memory] : normal memory [Appears Anxious] : appears anxious [de-identified] : Grade I-II systolic murmur [de-identified] : frequent extrasystoles heard on auscultation

## 2024-05-10 NOTE — ASSESSMENT
[FreeTextEntry1] : EKG 5/10/2024:  Sinus rhythm, rate of 74 bpm, frequent PVCs noted, leftward axis deviation.  Essentially unchanged.  In summary, 78-year-old gentleman, originally born in [place], who came to our office last month for a cardiac consultation with no prior significant cardiac history.  He has been treated for mild hypertension and hyperlipidemia and has been diagnosed with mild COPD and has been smoking cigarettes for approximately 60 years, averaging lately about 1/2 pack per day.    He readily admitted to feeling shortness of breath when doing any meaningful exertional walking activities.  He does no formal exercise. There is some family history for his father and uncle who had coronary artery disease.    A recent CT scan of the chest demonstrated stable pulmonary nodule, mild emphysema, and some early coronary artery calcifications.  He apparently underwent a cardio angiogram about 20 years ago, which was reported to be "normal".    He currently takes pantoprazole 40 mg QD, amlodipine 5 mg QD, and Crestor 5 mg QHS.  Patient has apparently been feeling lightheadedness almost daily for many years and has been worked up by multiple Specialists with no clear etiology.  He feels that this started shortly after getting a dental implant procedure.    Otherwise, he's been feeling overall good with only some minimal dyspnea on exertion that is mostly at baseline.  He denies associated symptoms such as exertional CP, orthopnea, PND, palpitations, near syncope, syncope, edema.   Blood pressure slightly elevated today in the 140s over 70s, but patient admits to not yet taking his Amlodipine 5 mg this morning.    Most recent labs (1/23/2024) demonstrated Total Cholesterol 154, LDL 79, HDL 52, Triglycerides 137, HgA1C 6.2%, Glucose 110, Creatinine 121, BUN 19.  Most recent cardiac testing showed some mild atherosclerotic plaquing on the left with no obstruction and overall normal cardiac structure was normal with some low-normal LV systolic function. There was also some mild (grade 1) LV diastolic dysfunction.  Fortunately, his nuclear stress test was unremarkable with no significant signs of obstructive CAD.     PLAN:  1).  Patient recommended to take all cardiac medications daily including Amlodipine 5 mg and Crestor 5 mg daily.  Considering patient is current everyday cigarette smoker, has risk factors including advancing age, family history, some recently found carotid plaquing, and other CV risk factors including hyperlipidemia and HTN; will recommend he take ASA 81 mg at least every other day, if not on daily basis.    Counseled patient on smoking cessation.   2).  Diet and lifestyle modification discussed including low sodium, low fat and low carbohydrate weight reducing diet.  Patient is to implement aerobic exercise regimen few days per week.   3).  Follow up with PCP (doesn't currently have PCP) regarding routine checkups and fasting blood work including lipid panel.  Forward all testing/lab work to our office.   4).  No additional cardiac testing indicated at this time. Recommend patient report any untoward symptoms.   5).  Follow up with Dr. Wilkins in 6 months or PRN.

## 2024-05-10 NOTE — REASON FOR VISIT
[FreeTextEntry1] : The patient is a very pleasant 78-year-old gentleman, originally born in [place], who came to our office last month for a cardiac consultation with no prior significant cardiac history.  He has been treated for mild hypertension and hyperlipidemia and has been diagnosed with mild COPD and has been smoking cigarettes for approximately 60 years, averaging lately about 1/2 pack per day.    He readily admitted to feeling shortness of breath when doing any meaningful exertional walking activities.  He does no formal exercise. There is some family history for his father and uncle who had coronary artery disease.    A recent CT scan of the chest demonstrated stable pulmonary nodule, mild emphysema, and some early coronary artery calcifications.  He apparently underwent a cardio angiogram about 20 years ago, which was reported to be "normal".    He currently takes pantoprazole 40 mg QD, amlodipine 5 mg QD, and Crestor 5 mg QHS.  Patient has apparently been feeling lightheadedness almost daily for many years and has been worked up by multiple Specialists with no clear etiology.  He feels that this started shortly after getting a dental implant procedure.    Otherwise, he's been feeling overall good with only some minimal dyspnea on exertion that is mostly at baseline.  He denies associated symptoms such as exertional CP, orthopnea, PND, palpitations, near syncope, syncope, edema.

## 2024-05-10 NOTE — HISTORY OF PRESENT ILLNESS
[FreeTextEntry1] : Blood pressure slightly elevated today in the 140s over 70s, but patient admits to not yet taking his Amlodipine 5 mg this morning.    Most recent labs (1/23/2024) demonstrated Total Cholesterol 154, LDL 79, HDL 52, Triglycerides 137, HgA1C 6.2%, Glucose 110, Creatinine 121, BUN 19.  Carotid Doppler (5/9/2024):  There is mild carotid atherosclerosis in LICA (1-19%) and there is NO atherosclerosis in KATHRYN. The left and right vertebral arteries demonstrate antegrade flow B/L.  Transthoracic Echocardiogram (5/9/2024):  The LV cavity and wall thickness is normal in size.  The LV systolic function is low-normal with LVEF 50-55%.  There is mild (grade 1) left ventricular diastolic dysfunction.  The left and right atria were normal in size and structure. Trace MR and mild TR. No pericardial effusion seen.   Pharmacologic Nuclear Stress Test (5/2/2024):  Normal myocardial perfusion scan, with no evidence of infarction or inducible ischemia. The post stress LV function was normal with LVEF 55%.  There were no ischemic ST segment changes on stress EKG.  There was occasional PVCs noted during stress; ie- negative study.

## 2024-06-08 ENCOUNTER — RX RENEWAL (OUTPATIENT)
Age: 79
End: 2024-06-08

## 2024-06-08 RX ORDER — PANTOPRAZOLE 40 MG/1
40 TABLET, DELAYED RELEASE ORAL
Qty: 30 | Refills: 3 | Status: ACTIVE | COMMUNITY
Start: 2023-09-13 | End: 1900-01-01

## 2024-07-05 ENCOUNTER — APPOINTMENT (OUTPATIENT)
Dept: FAMILY MEDICINE | Facility: CLINIC | Age: 79
End: 2024-07-05

## 2024-07-26 ENCOUNTER — NON-APPOINTMENT (OUTPATIENT)
Age: 79
End: 2024-07-26

## 2024-08-06 ENCOUNTER — APPOINTMENT (OUTPATIENT)
Dept: INTERNAL MEDICINE | Facility: CLINIC | Age: 79
End: 2024-08-06

## 2024-08-06 PROBLEM — R79.9 ABNORMAL BLOOD CHEMISTRY: Status: ACTIVE | Noted: 2024-08-06

## 2024-08-06 PROBLEM — F32.A DEPRESSION: Status: ACTIVE | Noted: 2024-08-06

## 2024-08-06 PROCEDURE — 99213 OFFICE O/P EST LOW 20 MIN: CPT | Mod: 25

## 2024-08-06 PROCEDURE — 36415 COLL VENOUS BLD VENIPUNCTURE: CPT

## 2024-08-06 PROCEDURE — G0439: CPT

## 2024-08-06 NOTE — PLAN
[FreeTextEntry1] : In addition to patient annual wellness visit, I have spent an additional of 24 minutes of time reviewing medical record in preparation to see the patient, performing a medically appropriate examination and/or evaluation, counseling and educating the patient, ordering medications, tests, or procedures, referring to and communicating with other health care professionals about management, and documenting clinical information in the electronic or other health record. >50% of time spent face to face counseling patient

## 2024-08-06 NOTE — HEALTH RISK ASSESSMENT
[Medical reason not done] : Medical reason not done [No] : In the past 12 months have you used drugs other than those required for medical reasons? No [Current] : Current [] :  [No falls in past year] : Patient reported no falls in the past year [Fully functional (bathing, dressing, toileting, transferring, walking, feeding)] : Fully functional (bathing, dressing, toileting, transferring, walking, feeding) [Fully functional (using the telephone, shopping, preparing meals, housekeeping, doing laundry, using] : Fully functional and needs no help or supervision to perform IADLs (using the telephone, shopping, preparing meals, housekeeping, doing laundry, using transportation, managing medications and managing finances) [de-identified] : depression [de-identified] : half a pack a day since age 18 [Reports changes in hearing] : Reports no changes in hearing [Reports changes in vision] : Reports no changes in vision

## 2024-08-06 NOTE — HISTORY OF PRESENT ILLNESS
[de-identified] : 79 y/o male with pmhx of htn, nicotine dependence, mild cognitive deficiency, presents for cpe. Patient overall doing well. Patient is concerned about pancreatic cancer as he had a half-sister with pancreatic cancer and his two brother in laws with it. Has epigastric abdominal pain. Denies any nausea or vomiting.

## 2024-08-06 NOTE — ASSESSMENT
[FreeTextEntry1] : CPE: -will order CBC w/ diff, CMP, Lipid, TSH and HgbA1c -Colon Cancer screening: up to date, had in 2023, no further colonoscopy needed, Dr. Parsons -Lung Cancer Screening: up to date, due in January 2025 -Vaccinations: Shingles: up to date -patient expressed understanding of plan, all questions answered  HTN continue amlodipine 5mg daily  HLD continue atorvastatin 10mg daily  Depression admits suicidal ideations but denies plan verbally contracted for safety, supports identified discussed the benefits of starting a medication to help with his symptoms patient asked about valium as needed, discussed that benzodiazepines like valium or xanax are not first line for the treatment of depression or anxiety- risks include risk of cognitive decline/dementia with long term benzo use, increased risk of hip fractures, car accidents, and risk of substance abuse/dependence not interested in medication or therapy at this time  Epigastric pain may be related to esophagitis as seen on EGD will send for MR abdomen w/w/o contrast to further evaluate

## 2024-08-13 ENCOUNTER — NON-APPOINTMENT (OUTPATIENT)
Age: 79
End: 2024-08-13

## 2024-11-06 ENCOUNTER — APPOINTMENT (OUTPATIENT)
Dept: OTOLARYNGOLOGY | Facility: CLINIC | Age: 79
End: 2024-11-06
Payer: MEDICARE

## 2024-11-06 VITALS
BODY MASS INDEX: 27.28 KG/M2 | SYSTOLIC BLOOD PRESSURE: 137 MMHG | HEIGHT: 68 IN | DIASTOLIC BLOOD PRESSURE: 85 MMHG | HEART RATE: 63 BPM | WEIGHT: 180 LBS

## 2024-11-06 DIAGNOSIS — H61.23 IMPACTED CERUMEN, BILATERAL: ICD-10-CM

## 2024-11-06 DIAGNOSIS — R49.0 DYSPHONIA: ICD-10-CM

## 2024-11-06 DIAGNOSIS — H92.01 OTALGIA, RIGHT EAR: ICD-10-CM

## 2024-11-06 DIAGNOSIS — F17.200 NICOTINE DEPENDENCE, UNSPECIFIED, UNCOMPLICATED: ICD-10-CM

## 2024-11-06 PROCEDURE — 99203 OFFICE O/P NEW LOW 30 MIN: CPT | Mod: 25

## 2024-11-06 PROCEDURE — 99204 OFFICE O/P NEW MOD 45 MIN: CPT | Mod: 25

## 2024-11-06 PROCEDURE — 69210 REMOVE IMPACTED EAR WAX UNI: CPT

## 2024-11-08 ENCOUNTER — APPOINTMENT (OUTPATIENT)
Dept: CARDIOLOGY | Facility: CLINIC | Age: 79
End: 2024-11-08

## 2024-11-20 ENCOUNTER — APPOINTMENT (OUTPATIENT)
Dept: OTOLARYNGOLOGY | Facility: CLINIC | Age: 79
End: 2024-11-20
Payer: MEDICARE

## 2024-11-20 VITALS
WEIGHT: 180 LBS | HEART RATE: 87 BPM | HEIGHT: 68 IN | DIASTOLIC BLOOD PRESSURE: 70 MMHG | BODY MASS INDEX: 27.28 KG/M2 | SYSTOLIC BLOOD PRESSURE: 106 MMHG

## 2024-11-20 DIAGNOSIS — H92.01 OTALGIA, RIGHT EAR: ICD-10-CM

## 2024-11-20 PROCEDURE — 99214 OFFICE O/P EST MOD 30 MIN: CPT

## 2024-12-24 PROBLEM — F10.90 ALCOHOL USE: Status: ACTIVE | Noted: 2018-12-21

## 2025-05-12 ENCOUNTER — RX CHANGE (OUTPATIENT)
Age: 80
End: 2025-05-12

## 2025-05-12 RX ORDER — AMLODIPINE BESYLATE 5 MG/1
5 TABLET ORAL
Qty: 90 | Refills: 1 | Status: ACTIVE | COMMUNITY
Start: 1900-01-01 | End: 1900-01-01

## 2025-09-11 ENCOUNTER — NON-APPOINTMENT (OUTPATIENT)
Age: 80
End: 2025-09-11

## 2025-09-11 ENCOUNTER — APPOINTMENT (OUTPATIENT)
Dept: INTERNAL MEDICINE | Facility: CLINIC | Age: 80
End: 2025-09-11

## (undated) DEVICE — FORCEP ENDOJAW AGTR LC W NDL 2.8MM 230CM DISP

## (undated) DEVICE — VALVE ENDO SURESEAL II 0-5FR